# Patient Record
Sex: FEMALE | Race: WHITE | NOT HISPANIC OR LATINO | URBAN - METROPOLITAN AREA
[De-identification: names, ages, dates, MRNs, and addresses within clinical notes are randomized per-mention and may not be internally consistent; named-entity substitution may affect disease eponyms.]

---

## 2022-04-21 ENCOUNTER — OFFICE (OUTPATIENT)
Dept: URBAN - METROPOLITAN AREA CLINIC 67 | Facility: CLINIC | Age: 72
End: 2022-04-21
Payer: OTHER GOVERNMENT

## 2022-04-21 VITALS
HEART RATE: 69 BPM | SYSTOLIC BLOOD PRESSURE: 130 MMHG | DIASTOLIC BLOOD PRESSURE: 68 MMHG | WEIGHT: 179 LBS | HEIGHT: 63 IN

## 2022-04-21 DIAGNOSIS — Z86.010 PERSONAL HISTORY OF COLONIC POLYPS: ICD-10-CM

## 2022-04-21 DIAGNOSIS — K59.00 CONSTIPATION, UNSPECIFIED: ICD-10-CM

## 2022-04-21 PROCEDURE — 99214 OFFICE O/P EST MOD 30 MIN: CPT | Performed by: SPECIALIST

## 2022-04-21 RX ORDER — LINACLOTIDE 145 UG/1
CAPSULE, GELATIN COATED ORAL
Qty: 90 | Refills: 3 | Status: ACTIVE

## 2022-04-21 NOTE — SERVICEHPINOTES
Chanell Buck   is seen today for a follow-up visit.     Pt has hx reflux and colon polyps.  Denies melena or hematochezia.  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
br
br Pt had pneumonia April 21.and hospitalized @ [facility] W/U:brEUS:4/8/99, 6/1/01, 7/6/01, 1/29/02, 9/12/05. 7/23/08, 1/10/00evOEA1/1108, 10/8/12, 4/13/15, 4/28/16, 5/13/16, 9/10/18, 98//19brColon: 7/6/01, 3/26/12, 4/13/15

## 2022-09-29 ENCOUNTER — OFFICE (OUTPATIENT)
Dept: URBAN - METROPOLITAN AREA CLINIC 67 | Facility: CLINIC | Age: 72
End: 2022-09-29
Payer: OTHER GOVERNMENT

## 2022-09-29 VITALS
DIASTOLIC BLOOD PRESSURE: 82 MMHG | HEART RATE: 90 BPM | HEIGHT: 63 IN | WEIGHT: 174 LBS | SYSTOLIC BLOOD PRESSURE: 140 MMHG

## 2022-09-29 DIAGNOSIS — R68.81 EARLY SATIETY: ICD-10-CM

## 2022-09-29 PROCEDURE — 99214 OFFICE O/P EST MOD 30 MIN: CPT | Performed by: SPECIALIST

## 2022-09-29 NOTE — SERVICENOTES
Lengthy discussion with patient.  She has chronic nodularity at anastomosis.  We discussed repeat EGD and high likelihood that EUS would be needed afterwards.  Therefore will refer to Dr Lamar to consider combined EGD/EUS.  Pt does not want to pursue repeat colonoscopy attempt.   Continue Linzess 290 mcg

## 2022-09-29 NOTE — SERVICEHPINOTES
Chanell  Heber   is seen today for a follow-up visit.     Pt had been doing well until last few months with early satiety and weight loss.  Linzess helps with constipation.  She denies N/V, melena, or hematochezia  .  Last attempt at colon prep was unsuccessfulbr
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 7/23/08, 1/10/11xyJMF8/1108, 10/8/12, 4/13/15, 4/28/16, 5/13/16, 9/10/18, 9/8/19brColon: 7/6/01, 3/26/12, 4/13/15

## 2023-03-06 ENCOUNTER — OFFICE (OUTPATIENT)
Dept: URBAN - METROPOLITAN AREA CLINIC 67 | Facility: CLINIC | Age: 73
End: 2023-03-06
Payer: OTHER GOVERNMENT

## 2023-03-06 VITALS
DIASTOLIC BLOOD PRESSURE: 78 MMHG | HEIGHT: 63 IN | SYSTOLIC BLOOD PRESSURE: 128 MMHG | OXYGEN SATURATION: 94 % | WEIGHT: 176 LBS | HEART RATE: 67 BPM

## 2023-03-06 DIAGNOSIS — K59.09 OTHER CONSTIPATION: ICD-10-CM

## 2023-03-06 DIAGNOSIS — Z85.028 PERSONAL HISTORY OF OTHER MALIGNANT NEOPLASM OF STOMACH: ICD-10-CM

## 2023-03-06 PROCEDURE — 99214 OFFICE O/P EST MOD 30 MIN: CPT | Performed by: SPECIALIST

## 2023-03-06 RX ORDER — LINACLOTIDE 290 UG/1
CAPSULE, GELATIN COATED ORAL
Qty: 90 | Refills: 4 | Status: ACTIVE

## 2023-03-06 NOTE — SERVICEHPINOTES
Chanell  Heber   is seen today for a follow-up visit.     Last EUS Dr Lamar 10/22 inflammatory polyp at eg anastomosis    Linzess helps with constipation.  She denies N/V, melena, or hematochezia.  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 7/23/08, 1/10/23mxJHB8/1108, 10/8/12, 4/13/15, 4/28/16, 5/13/16, 9/10/18, 9/8/19brColon: 7/6/01, 3/26/12, 4/13/15

## 2024-07-15 ENCOUNTER — OFFICE (OUTPATIENT)
Dept: URBAN - METROPOLITAN AREA CLINIC 67 | Facility: CLINIC | Age: 74
End: 2024-07-15
Payer: OTHER GOVERNMENT

## 2024-07-15 VITALS
HEIGHT: 63 IN | DIASTOLIC BLOOD PRESSURE: 82 MMHG | WEIGHT: 198 LBS | SYSTOLIC BLOOD PRESSURE: 150 MMHG | HEART RATE: 65 BPM | OXYGEN SATURATION: 96 %

## 2024-07-15 DIAGNOSIS — R60.9 EDEMA, UNSPECIFIED: ICD-10-CM

## 2024-07-15 DIAGNOSIS — K58.1 IRRITABLE BOWEL SYNDROME WITH CONSTIPATION: ICD-10-CM

## 2024-07-15 DIAGNOSIS — Z85.028 PERSONAL HISTORY OF OTHER MALIGNANT NEOPLASM OF STOMACH: ICD-10-CM

## 2024-07-15 PROCEDURE — 99214 OFFICE O/P EST MOD 30 MIN: CPT | Performed by: SPECIALIST

## 2024-07-15 RX ORDER — LINACLOTIDE 290 UG/1
CAPSULE, GELATIN COATED ORAL
Qty: 90 | Refills: 2 | Status: ACTIVE
Start: 2024-07-15

## 2024-07-15 NOTE — SERVICEHPINOTES
Pt doing well remote gastric cancer.  Regurgitation without aspiration.  denies melena or hematochezia.  Rare Linzess 290br
br
br  Last EUS Dr Lamar 10/22 inflammatory polyp at eg anastomosis  Linzess helps with constipation.  She denies N/V, melena, or hematochezia.  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  Was T2NoMX and saw Dr Kent.....had esophageal stent Dr Chisholm at time of leak and it had migrated when they went to remove itPt had pneumonia April 21.and hospitalized @ [facility] W/U:brEUS:4/8/99, 6/1/01, 7/6/01, 1/29/02, 9/12/05. 7/23/08, 1/10/20, 10/26tiEDS9/1108, 10/8/12, 4/13/15, 4/28/16, 5/13/16, 9/10/18, 9/8/19, brColon: 7/6/01, 3/26/12, 4/13/15

## 2024-07-15 NOTE — SERVICENOTES
Given remote gastric cancer and multiple neg EGD other than inflammatory polyp doubt repeat EGD would alter options at this point